# Patient Record
Sex: FEMALE | Race: WHITE | NOT HISPANIC OR LATINO | Employment: STUDENT | URBAN - METROPOLITAN AREA
[De-identification: names, ages, dates, MRNs, and addresses within clinical notes are randomized per-mention and may not be internally consistent; named-entity substitution may affect disease eponyms.]

---

## 2023-06-06 ENCOUNTER — OFFICE VISIT (OUTPATIENT)
Dept: PEDIATRICS | Facility: CLINIC | Age: 23
End: 2023-06-06
Payer: COMMERCIAL

## 2023-06-06 VITALS
HEART RATE: 70 BPM | DIASTOLIC BLOOD PRESSURE: 84 MMHG | SYSTOLIC BLOOD PRESSURE: 124 MMHG | HEIGHT: 63 IN | WEIGHT: 124 LBS | BODY MASS INDEX: 21.97 KG/M2

## 2023-06-06 DIAGNOSIS — Z00.00 WELLNESS EXAMINATION: Primary | ICD-10-CM

## 2023-06-06 PROBLEM — J30.2 SEASONAL ALLERGIES: Status: ACTIVE | Noted: 2023-06-06

## 2023-06-06 PROCEDURE — 99395 PREV VISIT EST AGE 18-39: CPT | Performed by: NURSE PRACTITIONER

## 2023-06-06 RX ORDER — NORGESTIMATE AND ETHINYL ESTRADIOL 0.25-0.035
KIT ORAL
COMMUNITY
Start: 2021-04-20 | End: 2023-06-06 | Stop reason: SDUPTHER

## 2023-06-06 RX ORDER — NORGESTIMATE AND ETHINYL ESTRADIOL 0.25-0.035
1 KIT ORAL DAILY
Qty: 28 TABLET | Refills: 11 | Status: SHIPPED | OUTPATIENT
Start: 2023-06-06

## 2023-06-06 NOTE — PROGRESS NOTES
Subjective   Jessica Hercules is a 23 y.o. who is brought in for their annual health maintenance visit.    Well Child 12-18 Year  Social  Lives with family while at home for the summer.    Diet  Balanced.    Dental  Sees dentist.  Brushes teeth regularly.    Elimination  No issues.    Menses / Dating  No issues. Currently on SUDHIR.     Sleep  No issues.    Activity / Work  Runs daily.  Denies exertional chest pain, syncope, shortness of breath.  Has 's license.    School /   Enrolled at [employer], studying early childhood ed.  Entering last year.   No concerns.    Specialist care  Followed by Allergy / Immunology for allergies. Yearly allergy shot and otherwise takes zyrtec PRN.     Visit screenings  N/A    No hearing concerns.  No vision concerns.     Objective   Growth parameters are noted and are appropriate for age.  Chaperone offered and declined.   Physical Exam  Constitutional:       General: She is not in acute distress.     Appearance: Normal appearance. She is normal weight.   HENT:      Head: Atraumatic.      Right Ear: Tympanic membrane, ear canal and external ear normal.      Left Ear: Tympanic membrane, ear canal and external ear normal.      Nose: Nose normal.      Mouth/Throat:      Mouth: Mucous membranes are moist.      Pharynx: Oropharynx is clear.   Eyes:      Extraocular Movements: Extraocular movements intact.      Pupils: Pupils are equal, round, and reactive to light.   Cardiovascular:      Rate and Rhythm: Regular rhythm.      Heart sounds: Normal heart sounds. No murmur heard.  Pulmonary:      Effort: Pulmonary effort is normal.      Breath sounds: Normal breath sounds.   Abdominal:      General: Abdomen is flat.      Palpations: Abdomen is soft. There is no mass.   Musculoskeletal:         General: Normal range of motion.      Cervical back: Normal range of motion and neck supple.   Skin:     General: Skin is warm and dry.   Neurological:      General: No focal deficit present.       Mental Status: She is alert and oriented to person, place, and time.       Assessment/Plan   Healthy 23 y.o..  1. Anticipatory guidance discussed.  Gave handout on well-child issues at this age.  2. Weight management:  The patient was counseled regarding nutrition and physical activity.  3. Development: appropriate for age  4. Follow-up visit in 1 year for next well child visit, or sooner as needed.  5. VIS's offered, as appropriate.    Diagnoses and all orders for this visit:  Wellness examination  -     norgestimate-ethinyl estradioL (Estarylla) 0.25-35 mg-mcg tablet; Take 1 tablet by mouth once daily.

## 2023-08-03 NOTE — TELEPHONE ENCOUNTER
Rx renewal request received via fax from [facility]. No action taken- patient has existing rx from 6/6/23 with 11 refills.

## 2024-03-28 ENCOUNTER — TELEPHONE (OUTPATIENT)
Dept: PRIMARY CARE | Facility: CLINIC | Age: 24
End: 2024-03-28
Payer: COMMERCIAL

## 2024-03-28 NOTE — TELEPHONE ENCOUNTER
Patient would like to know if you can will take her on as a new patient ? You also see her mother Tana Fisher.

## 2024-06-11 ENCOUNTER — LAB (OUTPATIENT)
Dept: LAB | Facility: LAB | Age: 24
End: 2024-06-11
Payer: COMMERCIAL

## 2024-06-11 ENCOUNTER — TELEPHONE (OUTPATIENT)
Dept: PRIMARY CARE | Facility: CLINIC | Age: 24
End: 2024-06-11

## 2024-06-11 ENCOUNTER — OFFICE VISIT (OUTPATIENT)
Dept: PRIMARY CARE | Facility: CLINIC | Age: 24
End: 2024-06-11
Payer: COMMERCIAL

## 2024-06-11 VITALS
DIASTOLIC BLOOD PRESSURE: 61 MMHG | HEIGHT: 63 IN | OXYGEN SATURATION: 95 % | HEART RATE: 64 BPM | WEIGHT: 129.9 LBS | BODY MASS INDEX: 23.02 KG/M2 | SYSTOLIC BLOOD PRESSURE: 105 MMHG

## 2024-06-11 DIAGNOSIS — Z11.1 SCREENING FOR TUBERCULOSIS: ICD-10-CM

## 2024-06-11 DIAGNOSIS — Z00.00 WELLNESS EXAMINATION: ICD-10-CM

## 2024-06-11 DIAGNOSIS — Z00.00 ANNUAL PHYSICAL EXAM: Primary | ICD-10-CM

## 2024-06-11 PROCEDURE — 86481 TB AG RESPONSE T-CELL SUSP: CPT

## 2024-06-11 PROCEDURE — 36415 COLL VENOUS BLD VENIPUNCTURE: CPT

## 2024-06-11 PROCEDURE — 90715 TDAP VACCINE 7 YRS/> IM: CPT | Performed by: INTERNAL MEDICINE

## 2024-06-11 PROCEDURE — 90471 IMMUNIZATION ADMIN: CPT | Performed by: INTERNAL MEDICINE

## 2024-06-11 PROCEDURE — 99385 PREV VISIT NEW AGE 18-39: CPT | Performed by: INTERNAL MEDICINE

## 2024-06-11 PROCEDURE — 1036F TOBACCO NON-USER: CPT | Performed by: INTERNAL MEDICINE

## 2024-06-11 RX ORDER — NORGESTIMATE AND ETHINYL ESTRADIOL 0.25-0.035
1 KIT ORAL DAILY
Qty: 84 TABLET | Refills: 3 | Status: SHIPPED | OUTPATIENT
Start: 2024-06-11 | End: 2024-06-11 | Stop reason: SDUPTHER

## 2024-06-11 RX ORDER — NORGESTIMATE AND ETHINYL ESTRADIOL 0.25-0.035
1 KIT ORAL DAILY
Qty: 84 TABLET | Refills: 3 | Status: SHIPPED | OUTPATIENT
Start: 2024-06-11 | End: 2025-06-11

## 2024-06-11 ASSESSMENT — PATIENT HEALTH QUESTIONNAIRE - PHQ9
2. FEELING DOWN, DEPRESSED OR HOPELESS: NOT AT ALL
SUM OF ALL RESPONSES TO PHQ9 QUESTIONS 1 AND 2: 0
1. LITTLE INTEREST OR PLEASURE IN DOING THINGS: NOT AT ALL

## 2024-06-11 NOTE — TELEPHONE ENCOUNTER
PATIENT MADE THE MISTAKE AND GAVE THE [facility] I HAVE ADDED THE [facility] IN [place] AND WOULD LIKE MEDICATION SENT THERE INSTEAD.  
WDL

## 2024-06-11 NOTE — PROGRESS NOTES
Subjective   Patient ID: Jessica Hercules is a 24 y.o. female who presents for physical for work,need tb test..  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  Needs TB testing.  No symptoms.  No known exposure  Does not have any new concerns today  Had eye exam.   Consumes healthy diet    does regular exercise  pregnancy historyg0  No bladder symptoms  Cervical cancer screen not done.  Not active.  Medical conditions:none  Vaccines:tdap due.    Review of Systems  General: No significant change in weight, no fatigue, no fever, chills, or night sweats.  HEENT: No headache, dizziness, syncope. No blurred vision, double vision. No hearing loss, or ringing. No nasal discharge, sinus problems. No loose teeth, sore throat, hoarseness or neck stiffness.   Breast: No pain or discharge. No mass felt.   Respiratory: No cough, mucous in throat, hemoptysis, wheezing, or shortness of breath. No snoring.  Cardiac: No chest pain, palpitations, orthopnea. No leg swelling or claudication pain.   Gastrointestinal: No indigestion, heartburn, nausea, vomiting, diarrhea, constipation, rectal bleeding or hemorrhoids.  Genitourinary: No UTI symptoms, stones, incontinence.  OBGYN: No abnormal bleeding, No pelvic pain or bloating.  Endocrine: No excess thirst or urination.  Hematopoietic: No anemia, easy bruising or bleeding. No history of blood transfusion.  Neuro: No localized weakness, numbness or tingling. No tremor, history of seizure. No history of memory problems.  Psychological: No anxiety, depression or sleep disturbance.    HISTORY  Social History     Socioeconomic History    Marital status: Single     Spouse name: Not on file    Number of children: Not on file    Years of education: Not on file    Highest education level: Not on file   Occupational History    Not on file   Tobacco Use    Smoking status: Never    Smokeless tobacco: Never   Substance  "and Sexual Activity    Alcohol use: Not Currently    Drug use: Never    Sexual activity: Not on file   Other Topics Concern    Not on file   Social History Narrative    Not on file     Social Determinants of Health     Financial Resource Strain: Not on file   Food Insecurity: Not on file   Transportation Needs: Not on file   Physical Activity: Not on file   Stress: Not on file   Social Connections: Not on file   Intimate Partner Violence: Not on file   Housing Stability: Not on file     Family History   Problem Relation Name Age of Onset    No Known Problems Mother      No Known Problems Father       Past Medical History:   Diagnosis Date    COVID-19     COVID-19    Encounter for general adult medical examination without abnormal findings 10/07/2015    Encounter for preventive health examination    Personal history of diseases of the skin and subcutaneous tissue 09/08/2017    History of eczema    Personal history of diseases of the skin and subcutaneous tissue 04/10/2017    History of cellulitis    Personal history of other infectious and parasitic diseases 08/04/2021    History of viral warts     History reviewed. No pertinent surgical history.  @VACCINES  Objective   /61   Pulse 64   Ht 1.607 m (5' 3.25\")   Wt 58.9 kg (129 lb 14.4 oz)   SpO2 95%   BMI 22.83 kg/m²        Physical Exam  In general, well-appearing, not in acute distress, alert and oriented.  HEENT: No pallor or icterus  Neck: No lymphadenopathy, no stiffness.  Supple.  .No JVD  Chest: Clear to auscultation, good air entry.  CVS: S1 and S2 regular.  No murmur, no gallop, S3 or S4.  No peripheral edema.  No carotid bruit.  Abdomen: Soft, no tenderness, no hepatosplenomegaly.  Extremities: No calf tenderness.  No peripheral edema.   Neuro: No focal deficits.  Psych: Mood and affect normal.  Good judgment and insight   Assessment/Plan   Problem List Items Addressed This Visit       Annual physical exam - Primary     Other Visit Diagnoses       " Wellness examination        Relevant Medications    norgestimate-ethinyl estradioL (Estarylla) 0.25-35 mg-mcg tablet          Preventive Exam and counseling completed  Healthy female  Tdap given  Check with work if T spot is okay instead of PPD test  Continue oral contraceptive.  She is taking it for regulating period..  No contraindications  She has not been sexually active  Continue healthy lifestyle.  Follow-up in 1 year and as needed

## 2024-06-13 LAB
NIL(NEG) CONTROL SPOT COUNT: NORMAL
PANEL A SPOT COUNT: 0
PANEL B SPOT COUNT: 0
POS CONTROL SPOT COUNT: NORMAL
T-SPOT. TB INTERPRETATION: NEGATIVE

## 2024-06-14 ENCOUNTER — TELEPHONE (OUTPATIENT)
Dept: PRIMARY CARE | Facility: CLINIC | Age: 24
End: 2024-06-14
Payer: COMMERCIAL

## 2024-06-14 NOTE — TELEPHONE ENCOUNTER
----- Message from Dalia Hinton MD sent at 6/14/2024 10:40 AM EDT -----  Advise TB test is negative.  If she has any forms for me to sign ask her to bring it to the office or upload to [employer]

## 2024-06-14 NOTE — RESULT ENCOUNTER NOTE
Advise TB test is negative.  If she has any forms for me to sign ask her to bring it to the office or upload to [employer]

## 2025-01-15 ENCOUNTER — TELEPHONE (OUTPATIENT)
Dept: PRIMARY CARE | Facility: CLINIC | Age: 25
End: 2025-01-15
Payer: COMMERCIAL

## 2025-01-15 NOTE — TELEPHONE ENCOUNTER
PT. 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 AND IF YOU DON'T FEEL COMFORTABLE WITH THAT THEN CAN YOU RECOMMEND A MEDICATION OVER COUNTER TO TAKE.

## 2025-03-25 ENCOUNTER — APPOINTMENT (OUTPATIENT)
Dept: OBSTETRICS AND GYNECOLOGY | Facility: CLINIC | Age: 25
End: 2025-03-25
Payer: COMMERCIAL

## 2025-03-25 VITALS — SYSTOLIC BLOOD PRESSURE: 118 MMHG | DIASTOLIC BLOOD PRESSURE: 73 MMHG | WEIGHT: 128 LBS | BODY MASS INDEX: 22.5 KG/M2

## 2025-03-25 DIAGNOSIS — Z12.4 PAP SMEAR FOR CERVICAL CANCER SCREENING: ICD-10-CM

## 2025-03-25 DIAGNOSIS — Z30.41 SURVEILLANCE FOR BIRTH CONTROL, ORAL CONTRACEPTIVES: ICD-10-CM

## 2025-03-25 DIAGNOSIS — Z01.419 WELL WOMAN EXAM WITH ROUTINE GYNECOLOGICAL EXAM: Primary | ICD-10-CM

## 2025-03-25 DIAGNOSIS — Z00.00 WELLNESS EXAMINATION: ICD-10-CM

## 2025-03-25 DIAGNOSIS — Z11.3 ROUTINE SCREENING FOR STI (SEXUALLY TRANSMITTED INFECTION): ICD-10-CM

## 2025-03-25 PROCEDURE — 1036F TOBACCO NON-USER: CPT | Performed by: NURSE PRACTITIONER

## 2025-03-25 PROCEDURE — 99385 PREV VISIT NEW AGE 18-39: CPT | Performed by: NURSE PRACTITIONER

## 2025-03-25 PROCEDURE — 88175 CYTOPATH C/V AUTO FLUID REDO: CPT

## 2025-03-25 RX ORDER — NORGESTIMATE AND ETHINYL ESTRADIOL 0.25-0.035
1 KIT ORAL DAILY
Qty: 84 TABLET | Refills: 3 | Status: SHIPPED | OUTPATIENT
Start: 2025-03-25 | End: 2026-03-25

## 2025-03-25 NOTE — PROGRESS NOTES
HPI: Jessica Hercules is a 24 y.o. year old  presenting for annual gyn exam  Pt new to this practice, currently on OCPs  Pt denies personal history of blood clots, stroke, HTN and migraines with aura. Pt is not a smoker.   Current concerns: reports two UTIs this year so far,  and [facility], no further symptoms. 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    Cycles are regular every 28 days, 4 days   LMP:  Patient's last menstrual period was 2025 (exact date).  Sexually active: yes  STI concerns: no  Contraception: OCPs   Last pap: never  History of abnormal pap: n/a  Other gyn history: none   OB History        0    Para   0    Term   0       0    AB   0    Living   0       SAB   0    IAB   0    Ectopic   0    Multiple   0    Live Births   0              Past Medical History:  No date: COVID-      Comment:  COVID-19  10/07/2015: Encounter for general adult medical examination without   abnormal findings      Comment:  Encounter for preventive health examination  2017: Personal history of diseases of the skin and subcutaneous   tissue      Comment:  History of eczema  04/10/2017: Personal history of diseases of the skin and subcutaneous   tissue      Comment:  History of cellulitis  2021: Personal history of other infectious and parasitic   diseases      Comment:  History of viral warts   History reviewed. No pertinent surgical history.   Social History    Socioeconomic History      Marital status: Single      Spouse name: Not on file      Number of children: Not on file      Years of education: Not on file      Highest education level: Not on file    Occupational History      Not on file    Tobacco Use      Smoking status: Never      Smokeless tobacco: Never    Vaping Use      Vaping status: Never Used    Substance and Sexual Activity      Alcohol use: Not Currently         Comment: social drinker      Drug use: Never      Sexual activity: Yes        Partners: Male        Birth control/protection: OCP    Other Topics      Concerns:        Not on file    Social History Narrative      Not on file    Social Drivers of Health  Financial Resource Strain: Not on file  Food Insecurity: Not on file  Transportation Needs: Not on file  Physical Activity: Not on file  Stress: Not on file  Social Connections: Not on file  Intimate Partner Violence: Not on file  Housing Stability: Not on file   Review of patient's family history indicates:  Problem: No Known Problems      Relation: Mother          Name:               Age of Onset: (Not Specified)  Problem: No Known Problems      Relation: Father          Name:               Age of Onset: (Not Specified)     Current Outpatient Medications:   norgestimate-ethinyl estradioL (Estarylla) 0.25-35 mg-mcg tablet, Take 1 tablet by mouth once daily., Disp: 84 tablet, Rfl: 3          REVIEW OF SYSTEMS:  Breast. denies masses, nipple discharge  : denies dysuria, hematuria incontinence   Gl: denies change in bowel habits, blood in stools      PHYSICAL EXAM:  Vitals: /73 (BP Location: Right arm, Patient Position: Sitting)   Wt 58.1 kg (128 lb)   LMP 03/19/2025 (Exact Date)   BMI 22.50 kg/m²   Gen: well appearing woman in NAD  HEENT: normocephalic, thyroid not enlarged, no lymphadenopathy  CV: no m/r/g  Chest: CTAB, no w/r/r  Breast: normal tissue bilaterally without masses, skin changes, lymphadenopathy   Abdomen: soft, nontender, no masses/hernias, liver and spleen not enlarged   Pelvic:  - external genitalia: normal  - vagina: normal  - cervix: normal  - uterus: normal size, nontender  - adnexa: no palpable masses or tenderness  RECTAL: no external hemorrhoids; rectal exam deferred    ASSESSMENT/PLAN :    1) Health maintenance, Well-woman exam.  Pap done today  Nutrition, exercise and routine health maintenance exams reviewed.  Calcium/Vitamin D  supplementation information provided   Smoking cessation: non-smoker  2) Contraception: OCPs satisfactory.   Discussed DVT and cardiovascular risk with OCP use. Patient is aware of this risk and desires to continue current oral contraceptive.   3) STD screening: declines,  accepts screening for gonorrhea and chlamydia.  4) Follow up one year or sooner as needed

## 2025-03-26 LAB
C TRACH RRNA SPEC QL NAA+PROBE: NOT DETECTED
N GONORRHOEA RRNA SPEC QL NAA+PROBE: NOT DETECTED
QUEST GC CT AMPLIFIED (ALWAYS MESSAGE): NORMAL

## 2025-03-28 LAB
CYTOLOGY CMNT CVX/VAG CYTO-IMP: NORMAL
LAB AP HPV HR: NORMAL
LABORATORY COMMENT REPORT: NORMAL
LMP START DATE: NORMAL
PATH REPORT.TOTAL CANCER: NORMAL

## 2025-12-22 ENCOUNTER — APPOINTMENT (OUTPATIENT)
Dept: PRIMARY CARE | Facility: CLINIC | Age: 25
End: 2025-12-22
Payer: COMMERCIAL